# Patient Record
Sex: FEMALE | Race: WHITE | NOT HISPANIC OR LATINO | URBAN - METROPOLITAN AREA
[De-identification: names, ages, dates, MRNs, and addresses within clinical notes are randomized per-mention and may not be internally consistent; named-entity substitution may affect disease eponyms.]

---

## 2023-09-14 ENCOUNTER — HOSPITAL ENCOUNTER (OUTPATIENT)
Dept: DATA CONVERSION | Facility: HOSPITAL | Age: 78
Discharge: HOME | End: 2023-09-14

## 2023-09-14 DIAGNOSIS — M47.817 SPONDYLOSIS WITHOUT MYELOPATHY OR RADICULOPATHY, LUMBOSACRAL REGION: ICD-10-CM

## 2023-09-14 DIAGNOSIS — M96.1 POSTLAMINECTOMY SYNDROME, NOT ELSEWHERE CLASSIFIED: ICD-10-CM

## 2025-04-08 ENCOUNTER — APPOINTMENT (OUTPATIENT)
Dept: CARDIOLOGY | Facility: HOSPITAL | Age: 80
End: 2025-04-08
Payer: MEDICARE

## 2025-04-08 ENCOUNTER — APPOINTMENT (OUTPATIENT)
Dept: RADIOLOGY | Facility: HOSPITAL | Age: 80
End: 2025-04-08
Payer: MEDICARE

## 2025-04-08 ENCOUNTER — HOSPITAL ENCOUNTER (OUTPATIENT)
Facility: HOSPITAL | Age: 80
Setting detail: OBSERVATION
LOS: 1 days | Discharge: HOME | End: 2025-04-09
Attending: EMERGENCY MEDICINE | Admitting: INTERNAL MEDICINE
Payer: MEDICARE

## 2025-04-08 DIAGNOSIS — R55 SYNCOPE, UNSPECIFIED SYNCOPE TYPE: Primary | ICD-10-CM

## 2025-04-08 DIAGNOSIS — R53.83 LETHARGY: ICD-10-CM

## 2025-04-08 DIAGNOSIS — I10 HYPERTENSION, UNSPECIFIED TYPE: ICD-10-CM

## 2025-04-08 DIAGNOSIS — S09.90XA CLOSED HEAD INJURY, INITIAL ENCOUNTER: ICD-10-CM

## 2025-04-08 LAB
ALBUMIN SERPL BCP-MCNC: 4 G/DL (ref 3.4–5)
ALP SERPL-CCNC: 75 U/L (ref 33–136)
ALT SERPL W P-5'-P-CCNC: 13 U/L (ref 7–45)
ANION GAP BLDV CALCULATED.4IONS-SCNC: 9 MMOL/L (ref 10–25)
ANION GAP SERPL CALCULATED.3IONS-SCNC: 12 MMOL/L (ref 10–20)
APPEARANCE UR: CLEAR
AST SERPL W P-5'-P-CCNC: 16 U/L (ref 9–39)
BASE EXCESS BLDV CALC-SCNC: 1.8 MMOL/L (ref -2–3)
BASOPHILS # BLD AUTO: 0.05 X10*3/UL (ref 0–0.1)
BASOPHILS NFR BLD AUTO: 0.7 %
BILIRUB SERPL-MCNC: 0.4 MG/DL (ref 0–1.2)
BILIRUB UR STRIP.AUTO-MCNC: NEGATIVE MG/DL
BNP SERPL-MCNC: 39 PG/ML (ref 0–99)
BODY TEMPERATURE: 37 DEGREES CELSIUS
BUN SERPL-MCNC: 21 MG/DL (ref 6–23)
CA-I BLDV-SCNC: 1.2 MMOL/L (ref 1.1–1.33)
CALCIUM SERPL-MCNC: 9.3 MG/DL (ref 8.6–10.3)
CARDIAC TROPONIN I PNL SERPL HS: 5 NG/L (ref 0–13)
CARDIAC TROPONIN I PNL SERPL HS: 5 NG/L (ref 0–13)
CHLORIDE BLDV-SCNC: 106 MMOL/L (ref 98–107)
CHLORIDE SERPL-SCNC: 107 MMOL/L (ref 98–107)
CO2 SERPL-SCNC: 25 MMOL/L (ref 21–32)
COLOR UR: NORMAL
CREAT SERPL-MCNC: 0.8 MG/DL (ref 0.5–1.05)
EGFRCR SERPLBLD CKD-EPI 2021: 74 ML/MIN/1.73M*2
EOSINOPHIL # BLD AUTO: 0.22 X10*3/UL (ref 0–0.4)
EOSINOPHIL NFR BLD AUTO: 3 %
ERYTHROCYTE [DISTWIDTH] IN BLOOD BY AUTOMATED COUNT: 13.3 % (ref 11.5–14.5)
FLUAV RNA RESP QL NAA+PROBE: NOT DETECTED
FLUBV RNA RESP QL NAA+PROBE: NOT DETECTED
GLUCOSE BLDV-MCNC: 107 MG/DL (ref 74–99)
GLUCOSE SERPL-MCNC: 106 MG/DL (ref 74–99)
GLUCOSE UR STRIP.AUTO-MCNC: NORMAL MG/DL
HCO3 BLDV-SCNC: 27.2 MMOL/L (ref 22–26)
HCT VFR BLD AUTO: 38.7 % (ref 36–46)
HCT VFR BLD EST: 38 % (ref 36–46)
HGB BLD-MCNC: 12.1 G/DL (ref 12–16)
HGB BLDV-MCNC: 12.5 G/DL (ref 12–16)
IMM GRANULOCYTES # BLD AUTO: 0.02 X10*3/UL (ref 0–0.5)
IMM GRANULOCYTES NFR BLD AUTO: 0.3 % (ref 0–0.9)
INHALED O2 CONCENTRATION: 21 %
KETONES UR STRIP.AUTO-MCNC: NEGATIVE MG/DL
LACTATE BLDV-SCNC: 1.3 MMOL/L (ref 0.4–2)
LACTATE SERPL-SCNC: 1.2 MMOL/L (ref 0.4–2)
LEUKOCYTE ESTERASE UR QL STRIP.AUTO: NEGATIVE
LYMPHOCYTES # BLD AUTO: 1.95 X10*3/UL (ref 0.8–3)
LYMPHOCYTES NFR BLD AUTO: 26.4 %
MAGNESIUM SERPL-MCNC: 2.16 MG/DL (ref 1.6–2.4)
MCH RBC QN AUTO: 28.7 PG (ref 26–34)
MCHC RBC AUTO-ENTMCNC: 31.3 G/DL (ref 32–36)
MCV RBC AUTO: 92 FL (ref 80–100)
MONOCYTES # BLD AUTO: 0.6 X10*3/UL (ref 0.05–0.8)
MONOCYTES NFR BLD AUTO: 8.1 %
NEUTROPHILS # BLD AUTO: 4.56 X10*3/UL (ref 1.6–5.5)
NEUTROPHILS NFR BLD AUTO: 61.5 %
NITRITE UR QL STRIP.AUTO: NEGATIVE
NRBC BLD-RTO: 0 /100 WBCS (ref 0–0)
OXYHGB MFR BLDV: 55.2 % (ref 45–75)
PCO2 BLDV: 45 MM HG (ref 41–51)
PH BLDV: 7.39 PH (ref 7.33–7.43)
PH UR STRIP.AUTO: 7 [PH]
PLATELET # BLD AUTO: 224 X10*3/UL (ref 150–450)
PO2 BLDV: 32 MM HG (ref 35–45)
POTASSIUM BLDV-SCNC: 4.3 MMOL/L (ref 3.5–5.3)
POTASSIUM SERPL-SCNC: 4.2 MMOL/L (ref 3.5–5.3)
PROT SERPL-MCNC: 6.7 G/DL (ref 6.4–8.2)
PROT UR STRIP.AUTO-MCNC: NEGATIVE MG/DL
RBC # BLD AUTO: 4.22 X10*6/UL (ref 4–5.2)
RBC # UR STRIP.AUTO: NEGATIVE MG/DL
SAO2 % BLDV: 56 % (ref 45–75)
SARS-COV-2 RNA RESP QL NAA+PROBE: NOT DETECTED
SODIUM BLDV-SCNC: 138 MMOL/L (ref 136–145)
SODIUM SERPL-SCNC: 140 MMOL/L (ref 136–145)
SP GR UR STRIP.AUTO: 1.02
UROBILINOGEN UR STRIP.AUTO-MCNC: NORMAL MG/DL
WBC # BLD AUTO: 7.4 X10*3/UL (ref 4.4–11.3)

## 2025-04-08 PROCEDURE — 70450 CT HEAD/BRAIN W/O DYE: CPT

## 2025-04-08 PROCEDURE — 99285 EMERGENCY DEPT VISIT HI MDM: CPT | Mod: 25 | Performed by: EMERGENCY MEDICINE

## 2025-04-08 PROCEDURE — 85025 COMPLETE CBC W/AUTO DIFF WBC: CPT

## 2025-04-08 PROCEDURE — 83880 ASSAY OF NATRIURETIC PEPTIDE: CPT

## 2025-04-08 PROCEDURE — 87636 SARSCOV2 & INF A&B AMP PRB: CPT

## 2025-04-08 PROCEDURE — 84484 ASSAY OF TROPONIN QUANT: CPT

## 2025-04-08 PROCEDURE — 2550000001 HC RX 255 CONTRASTS: Performed by: EMERGENCY MEDICINE

## 2025-04-08 PROCEDURE — 83605 ASSAY OF LACTIC ACID: CPT

## 2025-04-08 PROCEDURE — 1200000002 HC GENERAL ROOM WITH TELEMETRY DAILY

## 2025-04-08 PROCEDURE — 2500000004 HC RX 250 GENERAL PHARMACY W/ HCPCS (ALT 636 FOR OP/ED): Performed by: INTERNAL MEDICINE

## 2025-04-08 PROCEDURE — 83735 ASSAY OF MAGNESIUM: CPT

## 2025-04-08 PROCEDURE — 36415 COLL VENOUS BLD VENIPUNCTURE: CPT

## 2025-04-08 PROCEDURE — 2500000004 HC RX 250 GENERAL PHARMACY W/ HCPCS (ALT 636 FOR OP/ED): Performed by: EMERGENCY MEDICINE

## 2025-04-08 PROCEDURE — 2500000001 HC RX 250 WO HCPCS SELF ADMINISTERED DRUGS (ALT 637 FOR MEDICARE OP): Performed by: INTERNAL MEDICINE

## 2025-04-08 PROCEDURE — 72125 CT NECK SPINE W/O DYE: CPT

## 2025-04-08 PROCEDURE — 72125 CT NECK SPINE W/O DYE: CPT | Performed by: RADIOLOGY

## 2025-04-08 PROCEDURE — 93010 ELECTROCARDIOGRAM REPORT: CPT | Performed by: INTERNAL MEDICINE

## 2025-04-08 PROCEDURE — 96360 HYDRATION IV INFUSION INIT: CPT | Mod: 59

## 2025-04-08 PROCEDURE — 2500000004 HC RX 250 GENERAL PHARMACY W/ HCPCS (ALT 636 FOR OP/ED)

## 2025-04-08 PROCEDURE — 84132 ASSAY OF SERUM POTASSIUM: CPT

## 2025-04-08 PROCEDURE — 84132 ASSAY OF SERUM POTASSIUM: CPT | Mod: 59

## 2025-04-08 PROCEDURE — 71275 CT ANGIOGRAPHY CHEST: CPT | Performed by: RADIOLOGY

## 2025-04-08 PROCEDURE — 71275 CT ANGIOGRAPHY CHEST: CPT

## 2025-04-08 PROCEDURE — 2500000001 HC RX 250 WO HCPCS SELF ADMINISTERED DRUGS (ALT 637 FOR MEDICARE OP): Performed by: EMERGENCY MEDICINE

## 2025-04-08 PROCEDURE — 70450 CT HEAD/BRAIN W/O DYE: CPT | Performed by: RADIOLOGY

## 2025-04-08 PROCEDURE — 99222 1ST HOSP IP/OBS MODERATE 55: CPT | Performed by: INTERNAL MEDICINE

## 2025-04-08 PROCEDURE — 96361 HYDRATE IV INFUSION ADD-ON: CPT

## 2025-04-08 PROCEDURE — 81003 URINALYSIS AUTO W/O SCOPE: CPT | Performed by: EMERGENCY MEDICINE

## 2025-04-08 PROCEDURE — 93005 ELECTROCARDIOGRAM TRACING: CPT

## 2025-04-08 RX ORDER — ACETAMINOPHEN 325 MG/1
975 TABLET ORAL ONCE
Status: COMPLETED | OUTPATIENT
Start: 2025-04-08 | End: 2025-04-08

## 2025-04-08 RX ORDER — TRAZODONE HYDROCHLORIDE 50 MG/1
50 TABLET ORAL NIGHTLY
Status: DISCONTINUED | OUTPATIENT
Start: 2025-04-08 | End: 2025-04-09 | Stop reason: HOSPADM

## 2025-04-08 RX ORDER — CALCIUM CARBONATE 200(500)MG
500 TABLET,CHEWABLE ORAL ONCE
Status: COMPLETED | OUTPATIENT
Start: 2025-04-08 | End: 2025-04-08

## 2025-04-08 RX ORDER — GABAPENTIN 300 MG/1
300 CAPSULE ORAL ONCE
Status: COMPLETED | OUTPATIENT
Start: 2025-04-08 | End: 2025-04-08

## 2025-04-08 RX ORDER — ENOXAPARIN SODIUM 100 MG/ML
40 INJECTION SUBCUTANEOUS DAILY
Status: DISCONTINUED | OUTPATIENT
Start: 2025-04-08 | End: 2025-04-09 | Stop reason: HOSPADM

## 2025-04-08 RX ORDER — LANOLIN ALCOHOL/MO/W.PET/CERES
400 CREAM (GRAM) TOPICAL ONCE
Status: COMPLETED | OUTPATIENT
Start: 2025-04-08 | End: 2025-04-08

## 2025-04-08 RX ADMIN — Medication 400 MG: at 17:34

## 2025-04-08 RX ADMIN — SODIUM CHLORIDE 1000 ML: 900 INJECTION, SOLUTION INTRAVENOUS at 13:46

## 2025-04-08 RX ADMIN — TRAZODONE HYDROCHLORIDE 50 MG: 50 TABLET ORAL at 20:33

## 2025-04-08 RX ADMIN — GABAPENTIN 300 MG: 300 CAPSULE ORAL at 17:34

## 2025-04-08 RX ADMIN — IOHEXOL 75 ML: 350 INJECTION, SOLUTION INTRAVENOUS at 11:28

## 2025-04-08 RX ADMIN — CALCIUM CARBONATE (ANTACID) CHEW TAB 500 MG 500 MG: 500 CHEW TAB at 17:34

## 2025-04-08 RX ADMIN — SODIUM CHLORIDE 1000 ML: 900 INJECTION, SOLUTION INTRAVENOUS at 13:48

## 2025-04-08 RX ADMIN — SODIUM CHLORIDE 500 ML: 900 INJECTION, SOLUTION INTRAVENOUS at 11:43

## 2025-04-08 RX ADMIN — ACETAMINOPHEN 975 MG: 325 TABLET, FILM COATED ORAL at 11:42

## 2025-04-08 SDOH — SOCIAL STABILITY: SOCIAL INSECURITY: DO YOU FEEL ANYONE HAS EXPLOITED OR TAKEN ADVANTAGE OF YOU FINANCIALLY OR OF YOUR PERSONAL PROPERTY?: NO

## 2025-04-08 SDOH — SOCIAL STABILITY: SOCIAL INSECURITY: ARE YOU OR HAVE YOU BEEN THREATENED OR ABUSED PHYSICALLY, EMOTIONALLY, OR SEXUALLY BY ANYONE?: NO

## 2025-04-08 SDOH — SOCIAL STABILITY: SOCIAL INSECURITY: WERE YOU ABLE TO COMPLETE ALL THE BEHAVIORAL HEALTH SCREENINGS?: YES

## 2025-04-08 SDOH — SOCIAL STABILITY: SOCIAL INSECURITY: ABUSE: ADULT

## 2025-04-08 SDOH — SOCIAL STABILITY: SOCIAL INSECURITY: HAVE YOU HAD THOUGHTS OF HARMING ANYONE ELSE?: NO

## 2025-04-08 SDOH — SOCIAL STABILITY: SOCIAL INSECURITY: HAVE YOU HAD ANY THOUGHTS OF HARMING ANYONE ELSE?: NO

## 2025-04-08 SDOH — SOCIAL STABILITY: SOCIAL INSECURITY: DOES ANYONE TRY TO KEEP YOU FROM HAVING/CONTACTING OTHER FRIENDS OR DOING THINGS OUTSIDE YOUR HOME?: NO

## 2025-04-08 SDOH — SOCIAL STABILITY: SOCIAL INSECURITY: ARE THERE ANY APPARENT SIGNS OF INJURIES/BEHAVIORS THAT COULD BE RELATED TO ABUSE/NEGLECT?: NO

## 2025-04-08 SDOH — SOCIAL STABILITY: SOCIAL INSECURITY: DO YOU FEEL UNSAFE GOING BACK TO THE PLACE WHERE YOU ARE LIVING?: NO

## 2025-04-08 SDOH — SOCIAL STABILITY: SOCIAL INSECURITY: HAS ANYONE EVER THREATENED TO HURT YOUR FAMILY OR YOUR PETS?: NO

## 2025-04-08 ASSESSMENT — COGNITIVE AND FUNCTIONAL STATUS - GENERAL
MOBILITY SCORE: 24
DAILY ACTIVITIY SCORE: 24
MOBILITY SCORE: 24
DAILY ACTIVITIY SCORE: 24
PATIENT BASELINE BEDBOUND: NO

## 2025-04-08 ASSESSMENT — ACTIVITIES OF DAILY LIVING (ADL)
HEARING - LEFT EAR: FUNCTIONAL
PATIENT'S MEMORY ADEQUATE TO SAFELY COMPLETE DAILY ACTIVITIES?: YES
ADEQUATE_TO_COMPLETE_ADL: YES
BATHING: INDEPENDENT
HEARING - RIGHT EAR: FUNCTIONAL
GROOMING: INDEPENDENT
DRESSING YOURSELF: INDEPENDENT
WALKS IN HOME: INDEPENDENT
JUDGMENT_ADEQUATE_SAFELY_COMPLETE_DAILY_ACTIVITIES: YES
TOILETING: INDEPENDENT
LACK_OF_TRANSPORTATION: NO
FEEDING YOURSELF: INDEPENDENT

## 2025-04-08 ASSESSMENT — LIFESTYLE VARIABLES
HOW OFTEN DO YOU HAVE A DRINK CONTAINING ALCOHOL: MONTHLY OR LESS
SKIP TO QUESTIONS 9-10: 0
AUDIT-C TOTAL SCORE: 2
HOW OFTEN DO YOU HAVE 6 OR MORE DRINKS ON ONE OCCASION: LESS THAN MONTHLY
PRESCIPTION_ABUSE_PAST_12_MONTHS: NO
AUDIT-C TOTAL SCORE: 2
HOW MANY STANDARD DRINKS CONTAINING ALCOHOL DO YOU HAVE ON A TYPICAL DAY: 1 OR 2
SUBSTANCE_ABUSE_PAST_12_MONTHS: NO

## 2025-04-08 ASSESSMENT — ENCOUNTER SYMPTOMS
WEAKNESS: 1
DIZZINESS: 1
LIGHT-HEADEDNESS: 1
ABDOMINAL PAIN: 0
NUMBNESS: 0
FREQUENCY: 0
HEADACHES: 0
AGITATION: 0
FATIGUE: 1
BACK PAIN: 0
FEVER: 0
DIARRHEA: 0
SHORTNESS OF BREATH: 0
NECK PAIN: 0
CHILLS: 0
FLANK PAIN: 0
NAUSEA: 0
CONFUSION: 0
DYSURIA: 0
CHEST TIGHTNESS: 0
VOMITING: 0

## 2025-04-08 ASSESSMENT — PAIN SCALES - GENERAL
PAINLEVEL_OUTOF10: 0 - NO PAIN

## 2025-04-08 ASSESSMENT — PATIENT HEALTH QUESTIONNAIRE - PHQ9
2. FEELING DOWN, DEPRESSED OR HOPELESS: NOT AT ALL
1. LITTLE INTEREST OR PLEASURE IN DOING THINGS: NOT AT ALL
SUM OF ALL RESPONSES TO PHQ9 QUESTIONS 1 & 2: 0

## 2025-04-08 ASSESSMENT — PAIN - FUNCTIONAL ASSESSMENT
PAIN_FUNCTIONAL_ASSESSMENT: 0-10
PAIN_FUNCTIONAL_ASSESSMENT: 0-10

## 2025-04-08 NOTE — ASSESSMENT & PLAN NOTE
- most likely from dehydration from recent increased activity post-back surgery  - ECG, UA, BNP, lactate, troponins, CTA, CT- C-spine, and CT- head all unremarkable  - she has received 2500 mL of isotonic saline in the ED  - plan to order ECHO, carotid dopplers, and monitor with telemetry  - she will receive her afternoon doses of gabapentin 300 mg, magnesium 400 mg, calcium 600 mg, and her nighttime dose of trazadone 50 mg    JW-patient independently examined, history was independently confirmed.  Unclear as to what was the precedence event that caused the patient to have a syncopal event but she does state that this happened to her last year in August had a full workup for this including echocardiogram, Holter monitor, head CT, and carotid Dopplers all stating that this was normal.  This was done at [facility] mentor.  I explained to the patient and family at bedside that we will need to repeat some of these it has been about 8 months and maybe something is changed.  Patient also stated that she is been feeling very lethargic lately.  Patient's blood pressure was noted to be elevated so far during hospital stay.  Daughter stated that blood pressure medication was stopped last year after this was noticed to be low frequently.  There was concern that maybe her symptoms/were caused by hypotension.  However patient symptoms currently especially fatigue and not feeling herself could be related to hypertension.  Will continue to monitor.  I discussed with patient and daughters that may be adding 2.5 mg to 5 mg of amlodipine if blood pressure sustains.  In any case we will order as noted above evaluation for any cardio neuro causes.  Checking orthostatics.

## 2025-04-08 NOTE — H&P
History Of Present Illness  Carla Spain is a 79 y.o. female presenting with generalized malaise, fatigue and a syncopal event. The patient reportedly did not feel well at home and became lightheaded and then passed out and fell. She did hit her head. Unknown exactly how long she had a loss of consciousness for. Her children report that she had back surgery on 11 March 2025. Patient reports she has been much more active since her surgery, as her pain has improved greatly. She denies any headache or visual changes, neck or back pain, chest pain or SOB, abdominal pain, N/V, or diarrhea. Denies urinary complaints, fever, chills or recent illness. Denies focal weakness or numbness.      Past Medical History  History reviewed. No pertinent past medical history.    Surgical History  Past Surgical History:   Procedure Laterality Date    MR HEAD ANGIO WO IV CONTRAST  2/1/2016    MR HEAD ANGIO WO IV CONTRAST [facility] INPATIENT LEGACY        Social History  She has no history on file for tobacco use, alcohol use, and drug use.    Family History  No family history on file.     Allergies  Sulfa (sulfonamide antibiotics), Oxycodone-acetaminophen, and Tramadol    Review of Systems   Constitutional:  Positive for fatigue. Negative for chills and fever.   Respiratory:  Negative for chest tightness and shortness of breath.    Cardiovascular:  Negative for chest pain.   Gastrointestinal:  Negative for abdominal pain, diarrhea, nausea and vomiting.   Genitourinary:  Negative for dysuria, flank pain and frequency.   Musculoskeletal:  Negative for back pain and neck pain.   Neurological:  Positive for dizziness, syncope, weakness and light-headedness. Negative for numbness and headaches.   Psychiatric/Behavioral:  Negative for agitation and confusion.         Physical Exam  Constitutional:       General: She is not in acute distress.  HENT:      Head: Normocephalic and atraumatic.      Nose: No congestion or rhinorrhea.  "  Cardiovascular:      Rate and Rhythm: Normal rate and regular rhythm.   Pulmonary:      Effort: Pulmonary effort is normal. No respiratory distress.      Breath sounds: Normal breath sounds. No wheezing, rhonchi or rales.   Abdominal:      General: Abdomen is flat. There is no distension.      Palpations: Abdomen is soft.      Tenderness: There is no abdominal tenderness. There is no guarding or rebound.   Musculoskeletal:      Right lower leg: No edema.      Left lower leg: No edema.   Skin:     General: Skin is warm and dry.   Neurological:      Mental Status: She is alert.   Psychiatric:         Mood and Affect: Mood normal.         Behavior: Behavior normal.          Last Recorded Vitals  Blood pressure (!) 175/102, pulse 75, temperature 36.4 °C (97.6 °F), temperature source Oral, resp. rate 20, height 1.626 m (5' 4\"), weight 95.3 kg (210 lb), SpO2 95%.    Current Outpatient Medications  - Gabapentin 300 mg BID  - Magnesium 400 mg BID  - Calcium 600 mg BID  - Pravastatin 20 mg q.d  - Asprin 81 mg q.d  - Oxybutynin 5 mg q.d  - Trazadone 50 mg QHS      Relevant Results    Results for orders placed or performed during the hospital encounter of 04/08/25 (from the past 24 hours)   CBC and Auto Differential   Result Value Ref Range    WBC 7.4 4.4 - 11.3 x10*3/uL    nRBC 0.0 0.0 - 0.0 /100 WBCs    RBC 4.22 4.00 - 5.20 x10*6/uL    Hemoglobin 12.1 12.0 - 16.0 g/dL    Hematocrit 38.7 36.0 - 46.0 %    MCV 92 80 - 100 fL    MCH 28.7 26.0 - 34.0 pg    MCHC 31.3 (L) 32.0 - 36.0 g/dL    RDW 13.3 11.5 - 14.5 %    Platelets 224 150 - 450 x10*3/uL    Neutrophils % 61.5 40.0 - 80.0 %    Immature Granulocytes %, Automated 0.3 0.0 - 0.9 %    Lymphocytes % 26.4 13.0 - 44.0 %    Monocytes % 8.1 2.0 - 10.0 %    Eosinophils % 3.0 0.0 - 6.0 %    Basophils % 0.7 0.0 - 2.0 %    Neutrophils Absolute 4.56 1.60 - 5.50 x10*3/uL    Immature Granulocytes Absolute, Automated 0.02 0.00 - 0.50 x10*3/uL    Lymphocytes Absolute 1.95 0.80 - 3.00 " x10*3/uL    Monocytes Absolute 0.60 0.05 - 0.80 x10*3/uL    Eosinophils Absolute 0.22 0.00 - 0.40 x10*3/uL    Basophils Absolute 0.05 0.00 - 0.10 x10*3/uL   Comprehensive metabolic panel   Result Value Ref Range    Glucose 106 (H) 74 - 99 mg/dL    Sodium 140 136 - 145 mmol/L    Potassium 4.2 3.5 - 5.3 mmol/L    Chloride 107 98 - 107 mmol/L    Bicarbonate 25 21 - 32 mmol/L    Anion Gap 12 10 - 20 mmol/L    Urea Nitrogen 21 6 - 23 mg/dL    Creatinine 0.80 0.50 - 1.05 mg/dL    eGFR 74 >60 mL/min/1.73m*2    Calcium 9.3 8.6 - 10.3 mg/dL    Albumin 4.0 3.4 - 5.0 g/dL    Alkaline Phosphatase 75 33 - 136 U/L    Total Protein 6.7 6.4 - 8.2 g/dL    AST 16 9 - 39 U/L    Bilirubin, Total 0.4 0.0 - 1.2 mg/dL    ALT 13 7 - 45 U/L   Magnesium   Result Value Ref Range    Magnesium 2.16 1.60 - 2.40 mg/dL   Lactate   Result Value Ref Range    Lactate 1.2 0.4 - 2.0 mmol/L   B-Type Natriuretic Peptide   Result Value Ref Range    BNP 39 0 - 99 pg/mL   Blood Gas Venous Full Panel   Result Value Ref Range    POCT pH, Venous 7.39 7.33 - 7.43 pH    POCT pCO2, Venous 45 41 - 51 mm Hg    POCT pO2, Venous 32 (L) 35 - 45 mm Hg    POCT SO2, Venous 56 45 - 75 %    POCT Oxy Hemoglobin, Venous 55.2 45.0 - 75.0 %    POCT Hematocrit Calculated, Venous 38.0 36.0 - 46.0 %    POCT Sodium, Venous 138 136 - 145 mmol/L    POCT Potassium, Venous 4.3 3.5 - 5.3 mmol/L    POCT Chloride, Venous 106 98 - 107 mmol/L    POCT Ionized Calicum, Venous 1.20 1.10 - 1.33 mmol/L    POCT Glucose, Venous 107 (H) 74 - 99 mg/dL    POCT Lactate, Venous 1.3 0.4 - 2.0 mmol/L    POCT Base Excess, Venous 1.8 -2.0 - 3.0 mmol/L    POCT HCO3 Calculated, Venous 27.2 (H) 22.0 - 26.0 mmol/L    POCT Hemoglobin, Venous 12.5 12.0 - 16.0 g/dL    POCT Anion Gap, Venous 9.0 (L) 10.0 - 25.0 mmol/L    Patient Temperature 37.0 degrees Celsius    FiO2 21 %   Sars-CoV-2 and Influenza A/B PCR   Result Value Ref Range    Flu A Result Not Detected Not Detected    Flu B Result Not Detected Not  Detected    Coronavirus 2019, PCR Not Detected Not Detected   Troponin I, High Sensitivity, Initial   Result Value Ref Range    Troponin I, High Sensitivity 5 0 - 13 ng/L   Troponin, High Sensitivity, 1 Hour   Result Value Ref Range    Troponin I, High Sensitivity 5 0 - 13 ng/L   Urinalysis with Reflex Culture and Microscopic   Result Value Ref Range    Color, Urine Light-Yellow Light-Yellow, Yellow, Dark-Yellow    Appearance, Urine Clear Clear    Specific Gravity, Urine 1.016 1.005 - 1.035    pH, Urine 7.0 5.0, 5.5, 6.0, 6.5, 7.0, 7.5, 8.0    Protein, Urine NEGATIVE NEGATIVE, 10 (TRACE), 20 (TRACE) mg/dL    Glucose, Urine Normal Normal mg/dL    Blood, Urine NEGATIVE NEGATIVE mg/dL    Ketones, Urine NEGATIVE NEGATIVE mg/dL    Bilirubin, Urine NEGATIVE NEGATIVE mg/dL    Urobilinogen, Urine Normal Normal mg/dL    Nitrite, Urine NEGATIVE NEGATIVE    Leukocyte Esterase, Urine NEGATIVE NEGATIVE      CT angio chest for pulmonary embolism    Result Date: 4/8/2025  Interpreted By:  Mauricio Lincoln, STUDY: CT ANGIO CHEST FOR PULMONARY EMBOLISM;  4/8/2025 11:44 am   INDICATION: Signs/Symptoms:Syncopal episode, hypoxia.   COMPARISON: Chest radiograph 10/24/2022   ACCESSION NUMBER(S): [id number]   ORDERING CLINICIAN: CHARISSE PRATHER   TECHNIQUE: Helical data acquisition of the chest was obtained after the intravenous administration of  of contrast. Images were reformatted in coronal and sagittal planes. Axial and coronal MIP images were created and reviewed.   FINDINGS: POTENTIAL LIMITATIONS OF THE STUDY: None   HEART AND VESSELS: Ectatic and tortuous course of the descending thoracic aorta without evidence of aneurysm.   Main pulmonary artery measures up to 3 cm in anterior-posterior dimension which may indicate sequela of pulmonary hypertension.   The thoracic aorta is unremarkable with respect to course, caliber, and contour.   Mild coronary atherosclerotic calcifications.The study is not optimized for evaluation of coronary  arteries.   Cardiomegaly.   No evidence of pericardial effusion.   MEDIASTINUM AND RICH, LOWER NECK AND AXILLA: The visualized thyroid gland is within normal limits.   No evidence of thoracic lymphadenopathy by CT criteria.Subcentimeter mediastinal lymph nodes are present, nonspecific.   Small sliding-type hiatal hernia.   LUNGS AND AIRWAYS: The trachea and central airways are patent. No endobronchial lesion.   No consolidation, pneumothorax, or effusion.   Chronic appearing ankle changes with somewhat bibasilar predominance as well as chronic appearing parenchymal changes of the right middle lobe and lingula.   UPPER ABDOMEN: The visualized subdiaphragmatic structures demonstrate no remarkable findings.   CHEST WALL AND OSSEOUS STRUCTURES: No acute osseous abnormality.Multilevel degenerative changes are noted throughout the imaged spine.       1.  No evidence of pulmonary embolus. 2. Cardiomegaly with coronary atherosclerotic calcifications. 3. 3 cm main pulmonary artery diameter which may indicate pulmonary hypertension.     Signed by: Mauricio Lincoln 4/8/2025 12:27 PM Dictation workstation:   [id number]    CT cervical spine wo IV contrast    Result Date: 4/8/2025  Interpreted By:  Mauricio Lincoln, STUDY: CT CERVICAL SPINE WO IV CONTRAST;  4/8/2025 11:43 am   INDICATION: Signs/Symptoms:Fall.     COMPARISON: None.   ACCESSION NUMBER(S): [id number]   ORDERING CLINICIAN: CHARISSE PRATHER   TECHNIQUE: Axial CT images of the cervical spine are obtained. Axial, coronal and sagittal reconstructions are provided for review.   FINDINGS:     Fractures: There is no evidence for an acute fracture of the cervical spine.   Vertebral Alignment: No posttraumatic malalignment. Grade 1 anterolisthesis of C4 on C5, likely on a degenerative nature. There is overall straightening of the normal cervical lordosis, presumed secondary to patient positioning or spasm.   Craniocervical Junction: The odontoid process and craniocervical  junction are intact.   Vertebrae/Disc Spaces:  Multilevel disc space narrowing. Multilevel facet arthropathy Multilevel uncovertebral hypertrophy.Multilevel osteophyte formation.Multilevel presumed chronic or degenerative changes.   Prevertebral/Paraspinal Soft Tissues: The prevertebral and paraspinal soft tissues are unremarkable.         Multilevel spondylosis without acute osseous abnormality detected of the cervical spine.   MACRO: None   Signed by: Mauricio Lincoln 4/8/2025 12:04 PM Dictation workstation:   [id number]    CT head wo IV contrast    Result Date: 4/8/2025  Interpreted By:  Mauricio Lincoln, STUDY: CT HEAD WO IV CONTRAST;  4/8/2025 11:43 am   INDICATION: Signs/Symptoms:Fall.   COMPARISON: None.   ACCESSION NUMBER(S): [id number]   ORDERING CLINICIAN: CHARISSE PRATHER   TECHNIQUE: Noncontrast axial CT scan of head was performed. Angled reformats in brain and bone windows were generated. The images were reviewed in bone, brain, blood and soft tissue windows.   FINDINGS: CSF Spaces: The ventricles, sulci and basal cisterns are within normal limits. There is no extraaxial fluid collection.   Parenchyma: Mild parenchymal atrophy. Periventricular and subcortical white matter hypoattenuation is nonspecific, however likely reflects chronic microvascular ischemic versus chronic hypertensive changes. The grey-white differentiation is intact. There is no mass effect or midline shift.  There is no intracranial hemorrhage.   Calvarium: No acute displaced calvarial fracture.   Paranasal sinuses and mastoids: Mild bilateral ethmoid sinus mucosal thickening. Mastoid air cells appear clear.       No CT evidence of acute intracranial injury.       MACRO: None     Signed by: Mauricio Lincoln 4/8/2025 12:02 PM Dictation workstation:   [id number]         Assessment/Plan   Assessment & Plan  Syncope, unspecified syncope type  - most likely from dehydration from recent increased activity post-back surgery  - ECG, UA, BNP,  lactate, troponins, CTA, CT- C-spine, and CT- head all unremarkable  - she has received 2500 mL of isotonic saline in the ED  - plan to order ECHO, carotid dopplers, and monitor with telemetry  - she will receive her afternoon doses of gabapentin 300 mg, magnesium 400 mg, calcium 600 mg, and her nighttime dose of trazadone 50 mg    JW-patient independently examined, history was independently confirmed.  Unclear as to what was the precedence event that caused the patient to have a syncopal event but she does state that this happened to her last year in August had a full workup for this including echocardiogram, Holter monitor, head CT, and carotid Dopplers all stating that this was normal.  This was done at [facility] mentor.  I explained to the patient and family at bedside that we will need to repeat some of these it has been about 8 months and maybe something is changed.  Patient also stated that she is been feeling very lethargic lately.  Patient's blood pressure was noted to be elevated so far during hospital stay.  Daughter stated that blood pressure medication was stopped last year after this was noticed to be low frequently.  There was concern that maybe her symptoms/were caused by hypotension.  However patient symptoms currently especially fatigue and not feeling herself could be related to hypertension.  Will continue to monitor.  I discussed with patient and daughters that may be adding 2.5 mg to 5 mg of amlodipine if blood pressure sustains.  In any case we will order as noted above evaluation for any cardio neuro causes.  Checking orthostatics.       JOCELYN LOPEZ, OMS-III  4/8/2025  3:58PM

## 2025-04-08 NOTE — PROGRESS NOTES
Attestation/Supervisory note for JUAN F Baum      The patient is an 79-year-old female presenting to the emergency department for evaluation of generalized malaise, fatigue and a syncopal event.  The patient reportedly did not feel well at home and became lightheaded and then passed out and fell.  She did hit her head.  Unknown exactly how long she had a loss of consciousness for.  She states that she does not really have any symptoms right now other than generalized malaise and fatigue.  Her children report that she had back surgery on 11 March 2025.  They report that she did have a urinary tract in the past and had similar symptoms as she did today.  She denies any headache or visual changes.  No neck or back pain at this time.  No chest pain or shortness of breath.  No abdominal pain.  No nausea, vomiting or diarrhea.  No urinary complaints.  No fever or chills.  No focal weakness or numbness.  All pertinent positives and negatives are recorded above.  All other systems reviewed and otherwise negative.  Vital signs with hypertension but otherwise within normal limits.  Physical exam with a well-nourished well-developed female in no acute distress.  HEENT exam within normal limits.  She has no evidence of airway compromise or respiratory distress.  Abdominal exam is benign.  She does not have any gross motor, neurologic or vascular deficits on exam.  Pulses are equal bilaterally.  NIH stroke scale score of 0 at this time.      EKG with normal sinus rhythm at 79 bpm, low voltage, normal axis, normal ST segment, and a slight diffuse flattening of the T waves      Oral acetaminophen and IV fluids ordered.      Diagnostic labs without significant abnormality      Initial troponin of 5.  Repeat troponin 5      CT angio chest for pulmonary embolism   Final Result   1.  No evidence of pulmonary embolus.   2. Cardiomegaly with coronary atherosclerotic calcifications.   3. 3 cm main pulmonary artery diameter which may  indicate pulmonary   hypertension.             Signed by: Mauricio Stormkarel 4/8/2025 12:27 PM   Dictation workstation:   [id number]      CT head wo IV contrast   Final Result   No CT evidence of acute intracranial injury.                  MACRO:   None             Signed by: Mauricio Stormkarel 4/8/2025 12:02 PM   Dictation workstation:   [id number]      CT cervical spine wo IV contrast   Final Result   Multilevel spondylosis without acute osseous abnormality detected of   the cervical spine.        MACRO:   None        Signed by: Mauricio Stormkarel 4/8/2025 12:04 PM   Dictation workstation:   [id number]             The patient does not have any gross motor, neurologic or vascular episodes on exam.  No visible or palpable bony deformity.  NIH stroke scale score of 0 at this time.  No indication for tPA/TNK given that she does not have any neurologic deficits on exam.  CT head without evidence of intracranial hemorrhage or mass effect.  No evidence of skull fracture.  No evidence of CVA.  CT C-spine without evidence of fracture or dislocation.  CT chest abdomen pelvis shows no evidence of dissection or PE.  No evidence of pneumonia or pneumothorax.  No evidence of CHF.  No widening of the mediastinum.  No acute process within the abdomen and pelvis.  The patient was having difficulty with trial of ambulation due to her symptoms.  She states that she just feels very fatigued.  Her children are requesting that she be admitted for further management given her syncopal event.  The hospitalist was consulted and admitted the patient for further management.        Impression/diagnosis:  Syncope and collapse  Closed head injury  Hypertension, unspecified  Malaise and fatigue      I personally saw the patient and made/approve the management plan and take responsibility for the patient management.      I independently interpreted the following study (S) EKG and diagnostic labs      I personally discussed the patient's management with  the patient      I reviewed the results of the diagnostic labs and diagnostic imaging.  Formal radiology read was completed by the radiologist.      Carol Ann Gonzáles MD

## 2025-04-09 ENCOUNTER — APPOINTMENT (OUTPATIENT)
Dept: CARDIOLOGY | Facility: HOSPITAL | Age: 80
End: 2025-04-09
Payer: MEDICARE

## 2025-04-09 ENCOUNTER — PHARMACY VISIT (OUTPATIENT)
Dept: PHARMACY | Facility: CLINIC | Age: 80
End: 2025-04-09
Payer: MEDICARE

## 2025-04-09 ENCOUNTER — APPOINTMENT (OUTPATIENT)
Dept: RADIOLOGY | Facility: HOSPITAL | Age: 80
End: 2025-04-09
Payer: MEDICARE

## 2025-04-09 VITALS
HEART RATE: 85 BPM | WEIGHT: 210 LBS | RESPIRATION RATE: 20 BRPM | TEMPERATURE: 99 F | HEIGHT: 64 IN | DIASTOLIC BLOOD PRESSURE: 90 MMHG | SYSTOLIC BLOOD PRESSURE: 140 MMHG | OXYGEN SATURATION: 94 % | BODY MASS INDEX: 35.85 KG/M2

## 2025-04-09 LAB
ANION GAP SERPL CALCULATED.3IONS-SCNC: 9 MMOL/L (ref 10–20)
AORTIC VALVE MEAN GRADIENT: 4 MMHG
AORTIC VALVE PEAK VELOCITY: 1.28 M/S
ATRIAL RATE: 79 BPM
AV PEAK GRADIENT: 7 MMHG
AVA (PEAK VEL): 2.53 CM2
AVA (VTI): 2.42 CM2
BODY SURFACE AREA: 2.07 M2
BODY SURFACE AREA: 2.07 M2
BUN SERPL-MCNC: 14 MG/DL (ref 6–23)
CALCIUM SERPL-MCNC: 8.6 MG/DL (ref 8.6–10.3)
CHLORIDE SERPL-SCNC: 112 MMOL/L (ref 98–107)
CO2 SERPL-SCNC: 23 MMOL/L (ref 21–32)
CREAT SERPL-MCNC: 0.66 MG/DL (ref 0.5–1.05)
EGFRCR SERPLBLD CKD-EPI 2021: 88 ML/MIN/1.73M*2
EJECTION FRACTION APICAL 4 CHAMBER: 63.5
EJECTION FRACTION: 63 %
GLUCOSE SERPL-MCNC: 95 MG/DL (ref 74–99)
LEFT VENTRICLE INTERNAL DIMENSION DIASTOLE: 4.17 CM (ref 3.5–6)
LEFT VENTRICULAR OUTFLOW TRACT DIAMETER: 2 CM
MITRAL VALVE E/A RATIO: 0.63
P AXIS: 53 DEGREES
P OFFSET: 189 MS
P ONSET: 130 MS
POTASSIUM SERPL-SCNC: 4 MMOL/L (ref 3.5–5.3)
PR INTERVAL: 188 MS
Q ONSET: 224 MS
QRS COUNT: 13 BEATS
QRS DURATION: 94 MS
QT INTERVAL: 412 MS
QTC CALCULATION(BAZETT): 472 MS
QTC FREDERICIA: 451 MS
R AXIS: 21 DEGREES
RIGHT VENTRICLE FREE WALL PEAK S': 15.1 CM/S
SODIUM SERPL-SCNC: 140 MMOL/L (ref 136–145)
T AXIS: 3 DEGREES
T OFFSET: 430 MS
TRICUSPID ANNULAR PLANE SYSTOLIC EXCURSION: 2.4 CM
VENTRICULAR RATE: 79 BPM

## 2025-04-09 PROCEDURE — RXMED WILLOW AMBULATORY MEDICATION CHARGE

## 2025-04-09 PROCEDURE — G0378 HOSPITAL OBSERVATION PER HR: HCPCS

## 2025-04-09 PROCEDURE — 93246 EXT ECG>7D<15D RECORDING: CPT

## 2025-04-09 PROCEDURE — 2500000001 HC RX 250 WO HCPCS SELF ADMINISTERED DRUGS (ALT 637 FOR MEDICARE OP): Performed by: INTERNAL MEDICINE

## 2025-04-09 PROCEDURE — 36415 COLL VENOUS BLD VENIPUNCTURE: CPT | Performed by: INTERNAL MEDICINE

## 2025-04-09 PROCEDURE — 99239 HOSP IP/OBS DSCHRG MGMT >30: CPT | Performed by: INTERNAL MEDICINE

## 2025-04-09 PROCEDURE — 93306 TTE W/DOPPLER COMPLETE: CPT

## 2025-04-09 PROCEDURE — 93880 EXTRACRANIAL BILAT STUDY: CPT

## 2025-04-09 PROCEDURE — 80048 BASIC METABOLIC PNL TOTAL CA: CPT | Performed by: INTERNAL MEDICINE

## 2025-04-09 PROCEDURE — 93880 EXTRACRANIAL BILAT STUDY: CPT | Performed by: RADIOLOGY

## 2025-04-09 PROCEDURE — 93306 TTE W/DOPPLER COMPLETE: CPT | Performed by: INTERNAL MEDICINE

## 2025-04-09 PROCEDURE — 2500000004 HC RX 250 GENERAL PHARMACY W/ HCPCS (ALT 636 FOR OP/ED): Performed by: INTERNAL MEDICINE

## 2025-04-09 RX ORDER — ASPIRIN 81 MG/1
81 TABLET ORAL DAILY
Status: DISCONTINUED | OUTPATIENT
Start: 2025-04-09 | End: 2025-04-09 | Stop reason: HOSPADM

## 2025-04-09 RX ORDER — AMLODIPINE BESYLATE 2.5 MG/1
2.5 TABLET ORAL DAILY
Status: DISCONTINUED | OUTPATIENT
Start: 2025-04-09 | End: 2025-04-09 | Stop reason: HOSPADM

## 2025-04-09 RX ORDER — GABAPENTIN 300 MG/1
300 CAPSULE ORAL 2 TIMES DAILY
Start: 2025-04-09

## 2025-04-09 RX ORDER — PRAVASTATIN SODIUM 20 MG/1
20 TABLET ORAL NIGHTLY
Start: 2025-04-09

## 2025-04-09 RX ORDER — PRAVASTATIN SODIUM 20 MG/1
20 TABLET ORAL NIGHTLY
Status: DISCONTINUED | OUTPATIENT
Start: 2025-04-09 | End: 2025-04-09 | Stop reason: HOSPADM

## 2025-04-09 RX ORDER — AMLODIPINE BESYLATE 2.5 MG/1
2.5 TABLET ORAL DAILY
Qty: 30 TABLET | Refills: 0 | Status: SHIPPED | OUTPATIENT
Start: 2025-04-10 | End: 2025-05-10

## 2025-04-09 RX ORDER — CALCIUM CARBONATE 500(1250)
1250 TABLET ORAL
Status: DISCONTINUED | OUTPATIENT
Start: 2025-04-09 | End: 2025-04-09 | Stop reason: HOSPADM

## 2025-04-09 RX ORDER — CALCIUM CARBONATE 500(1250)
1250 TABLET ORAL
Start: 2025-04-09

## 2025-04-09 RX ORDER — TRAZODONE HYDROCHLORIDE 50 MG/1
50 TABLET ORAL NIGHTLY
Start: 2025-04-09

## 2025-04-09 RX ORDER — GABAPENTIN 300 MG/1
300 CAPSULE ORAL 2 TIMES DAILY
Status: DISCONTINUED | OUTPATIENT
Start: 2025-04-09 | End: 2025-04-09 | Stop reason: HOSPADM

## 2025-04-09 RX ORDER — ASPIRIN 81 MG/1
81 TABLET ORAL DAILY
Start: 2025-04-10

## 2025-04-09 RX ADMIN — PERFLUTREN 2 ML OF DILUTION: 6.52 INJECTION, SUSPENSION INTRAVENOUS at 09:35

## 2025-04-09 RX ADMIN — AMLODIPINE BESYLATE 2.5 MG: 2.5 TABLET ORAL at 10:17

## 2025-04-09 RX ADMIN — ASPIRIN 81 MG: 81 TABLET, COATED ORAL at 11:06

## 2025-04-09 RX ADMIN — GABAPENTIN 300 MG: 300 CAPSULE ORAL at 11:06

## 2025-04-09 SDOH — ECONOMIC STABILITY: HOUSING INSECURITY: IN THE LAST 12 MONTHS, WAS THERE A TIME WHEN YOU WERE NOT ABLE TO PAY THE MORTGAGE OR RENT ON TIME?: NO

## 2025-04-09 SDOH — SOCIAL STABILITY: SOCIAL INSECURITY: WITHIN THE LAST YEAR, HAVE YOU BEEN HUMILIATED OR EMOTIONALLY ABUSED IN OTHER WAYS BY YOUR PARTNER OR EX-PARTNER?: NO

## 2025-04-09 SDOH — SOCIAL STABILITY: SOCIAL NETWORK
DO YOU BELONG TO ANY CLUBS OR ORGANIZATIONS SUCH AS CHURCH GROUPS, UNIONS, FRATERNAL OR ATHLETIC GROUPS, OR SCHOOL GROUPS?: YES

## 2025-04-09 SDOH — HEALTH STABILITY: MENTAL HEALTH: HOW OFTEN DO YOU HAVE SIX OR MORE DRINKS ON ONE OCCASION?: NEVER

## 2025-04-09 SDOH — ECONOMIC STABILITY: FOOD INSECURITY: HOW HARD IS IT FOR YOU TO PAY FOR THE VERY BASICS LIKE FOOD, HOUSING, MEDICAL CARE, AND HEATING?: NOT VERY HARD

## 2025-04-09 SDOH — SOCIAL STABILITY: SOCIAL INSECURITY
WITHIN THE LAST YEAR, HAVE YOU BEEN RAPED OR FORCED TO HAVE ANY KIND OF SEXUAL ACTIVITY BY YOUR PARTNER OR EX-PARTNER?: NO

## 2025-04-09 SDOH — HEALTH STABILITY: MENTAL HEALTH: HOW MANY DRINKS CONTAINING ALCOHOL DO YOU HAVE ON A TYPICAL DAY WHEN YOU ARE DRINKING?: 1 OR 2

## 2025-04-09 SDOH — ECONOMIC STABILITY: FOOD INSECURITY: WITHIN THE PAST 12 MONTHS, YOU WORRIED THAT YOUR FOOD WOULD RUN OUT BEFORE YOU GOT THE MONEY TO BUY MORE.: NEVER TRUE

## 2025-04-09 SDOH — ECONOMIC STABILITY: INCOME INSECURITY: IN THE PAST 12 MONTHS HAS THE ELECTRIC, GAS, OIL, OR WATER COMPANY THREATENED TO SHUT OFF SERVICES IN YOUR HOME?: NO

## 2025-04-09 SDOH — ECONOMIC STABILITY: HOUSING INSECURITY: AT ANY TIME IN THE PAST 12 MONTHS, WERE YOU HOMELESS OR LIVING IN A SHELTER (INCLUDING NOW)?: NO

## 2025-04-09 SDOH — HEALTH STABILITY: PHYSICAL HEALTH: ON AVERAGE, HOW MANY DAYS PER WEEK DO YOU ENGAGE IN MODERATE TO STRENUOUS EXERCISE (LIKE A BRISK WALK)?: 0 DAYS

## 2025-04-09 SDOH — HEALTH STABILITY: MENTAL HEALTH: HOW OFTEN DO YOU HAVE A DRINK CONTAINING ALCOHOL?: MONTHLY OR LESS

## 2025-04-09 SDOH — HEALTH STABILITY: PHYSICAL HEALTH
HOW OFTEN DO YOU NEED TO HAVE SOMEONE HELP YOU WHEN YOU READ INSTRUCTIONS, PAMPHLETS, OR OTHER WRITTEN MATERIAL FROM YOUR DOCTOR OR PHARMACY?: NEVER

## 2025-04-09 SDOH — SOCIAL STABILITY: SOCIAL NETWORK: HOW OFTEN DO YOU ATTEND CHURCH OR RELIGIOUS SERVICES?: NEVER

## 2025-04-09 SDOH — SOCIAL STABILITY: SOCIAL NETWORK: HOW OFTEN DO YOU GET TOGETHER WITH FRIENDS OR RELATIVES?: MORE THAN THREE TIMES A WEEK

## 2025-04-09 SDOH — SOCIAL STABILITY: SOCIAL NETWORK: HOW OFTEN DO YOU ATTEND MEETINGS OF THE CLUBS OR ORGANIZATIONS YOU BELONG TO?: MORE THAN 4 TIMES PER YEAR

## 2025-04-09 SDOH — ECONOMIC STABILITY: HOUSING INSECURITY: IN THE PAST 12 MONTHS, HOW MANY TIMES HAVE YOU MOVED WHERE YOU WERE LIVING?: 0

## 2025-04-09 SDOH — HEALTH STABILITY: MENTAL HEALTH
DO YOU FEEL STRESS - TENSE, RESTLESS, NERVOUS, OR ANXIOUS, OR UNABLE TO SLEEP AT NIGHT BECAUSE YOUR MIND IS TROUBLED ALL THE TIME - THESE DAYS?: NOT AT ALL

## 2025-04-09 SDOH — SOCIAL STABILITY: SOCIAL INSECURITY: WITHIN THE LAST YEAR, HAVE YOU BEEN AFRAID OF YOUR PARTNER OR EX-PARTNER?: NO

## 2025-04-09 SDOH — SOCIAL STABILITY: SOCIAL NETWORK
IN A TYPICAL WEEK, HOW MANY TIMES DO YOU TALK ON THE PHONE WITH FAMILY, FRIENDS, OR NEIGHBORS?: MORE THAN THREE TIMES A WEEK

## 2025-04-09 SDOH — ECONOMIC STABILITY: FOOD INSECURITY: WITHIN THE PAST 12 MONTHS, THE FOOD YOU BOUGHT JUST DIDN'T LAST AND YOU DIDN'T HAVE MONEY TO GET MORE.: NEVER TRUE

## 2025-04-09 SDOH — ECONOMIC STABILITY: TRANSPORTATION INSECURITY: IN THE PAST 12 MONTHS, HAS LACK OF TRANSPORTATION KEPT YOU FROM MEDICAL APPOINTMENTS OR FROM GETTING MEDICATIONS?: NO

## 2025-04-09 ASSESSMENT — COGNITIVE AND FUNCTIONAL STATUS - GENERAL
DAILY ACTIVITIY SCORE: 24
MOBILITY SCORE: 24

## 2025-04-09 ASSESSMENT — LIFESTYLE VARIABLES
SKIP TO QUESTIONS 9-10: 1
AUDIT-C TOTAL SCORE: 1

## 2025-04-09 ASSESSMENT — PAIN SCALES - GENERAL: PAINLEVEL_OUTOF10: 0 - NO PAIN

## 2025-04-09 ASSESSMENT — ACTIVITIES OF DAILY LIVING (ADL)
LACK_OF_TRANSPORTATION: NO
LACK_OF_TRANSPORTATION: NO

## 2025-04-09 NOTE — PROGRESS NOTES
04/09/25 1244   Discharge Planning   Living Arrangements Alone   Support Systems Children;Family members   Assistance Needed Independent with ADLs and IADLs   Type of Residence Private residence  (Mobile home one level)   Number of Stairs to Enter Residence 3   Number of Stairs Within Residence 0   Do you have animals or pets at home? No   Who is requesting discharge planning? Provider   Home or Post Acute Services None   Expected Discharge Disposition Home   Does the patient need discharge transport arranged? No   Financial Resource Strain   How hard is it for you to pay for the very basics like food, housing, medical care, and heating? Not very   Housing Stability   In the last 12 months, was there a time when you were not able to pay the mortgage or rent on time? N   In the past 12 months, how many times have you moved where you were living? 0   At any time in the past 12 months, were you homeless or living in a shelter (including now)? N   Transportation Needs   In the past 12 months, has lack of transportation kept you from medical appointments or from getting medications? no   In the past 12 months, has lack of transportation kept you from meetings, work, or from getting things needed for daily living? No   Intensity of Service   Intensity of Service 0-30 min     Carla Spain is a 79 y.o. female presenting with generalized malaise, fatigue and a syncopal event. The patient reportedly did not feel well at home and became lightheaded and then passed out and fell. She did hit her head. Unknown exactly how long she had a loss of consciousness for.   Her children report that she had back surgery on 11 March 2025. Patient reports she has been much more active since her surgery, as her pain has improved greatly.   Patient lives alone in a mobile one level home. Has a supportive family. Independent with ADLs and IADLs. Is able to drive and is active. Did have [facility], after back surgery, currently not active  with [facility]. Will have no needs upon discharge,    PLAN: Home with no needs

## 2025-04-09 NOTE — PROGRESS NOTES
Spiritual Care Visit  Spiritual Care Request    Reason for Visit:  Routine Visit: Introduction     Request Received From:       Focus of Care:  Visited With: Patient         Refer to :          Spiritual Care Assessment    Spiritual Assessment:                      Care Provided:  Intended Effects: Build relationship of care and support, Convey a calming presence, Demonstrate caring and concern    Sense of Community and or Taoism Affiliation:  Buddhism   Values/Beliefs  Spiritual Requests During Hospitalization: Carla asked to be anointed and tohae Communion today.     Addressed Needs/Concerns and/or Maurisio Through:     Sacramental Encounters  Communion: Patient wants communion  Communion Given Indicator: Yes  Sacrament of Sick-Anointing: Anointed    Outcome:        Advance Directives:         Spiritual Care Annotation    Annotation:  Carla asked to be anointed and to have Communion today.  Alcides Melissa

## 2025-04-09 NOTE — DISCHARGE SUMMARY
Discharge Diagnosis  - Syncope, unspecified syncope type  - HTN    Issues Requiring Follow-Up  - Syncope, unspecified syncope type  - HTN    Discharge Meds     Medication List      START taking these medications     amLODIPine 2.5 mg tablet; Commonly known as: Norvasc; Take 1 tablet (2.5   mg) by mouth once daily.; Start taking on: April 10, 2025   aspirin 81 mg EC tablet; Take 1 tablet (81 mg) by mouth once daily.;   Start taking on: April 10, 2025   calcium carbonate 500 mg calcium (1,250 mg) tablet; Commonly known as:   Oscal; Take 1 tablet (1,250 mg) by mouth 2 times daily (morning and late   afternoon).   gabapentin 300 mg capsule; Commonly known as: Neurontin; Take 1 capsule   (300 mg) by mouth 2 times a day.   pravastatin 20 mg tablet; Commonly known as: Pravachol; Take 1 tablet   (20 mg) by mouth once daily at bedtime.   traZODone 50 mg tablet; Commonly known as: Desyrel; Take 1 tablet (50   mg) by mouth once daily at bedtime.       Test Results Pending At Discharge  Pending Labs       Order Current Status    Extra Urine Gray Tube Collected (04/08/25 1224)    Urinalysis with Reflex Culture and Microscopic In process            Hospital Course   The patient is an 79-year-old female who presented with generalized malaise, fatigue, and a syncopal episode with minor head injury. She denied any ongoing neurological, cardiac, or gastrointestinal symptoms. Initial ED evaluation showed hypertension but was otherwise stable. She received 2,500 mL of IV fluids. Workup including EKG, labs, CT head, CT C-spine, CTA chest, and urinalysis was unremarkable.    Telemetry monitoring showed no arrhythmias. Orthostatic vitals were not significant. Echocardiogram and carotid Doppler studies were completed and were without acute findings. Her home medications were continued throughout her stay. She was given and discharged with Amlodipine 2.5 mg and adivsed to follow-up with her PCP for further management of her HTN.     She  remained stable throughout hospitalization with no further events and improved symptoms. She is safe for discharge with outpatient follow-up planned. Patient was agreeable with plan to be discharged home today.    Pertinent Physical Exam At Time of Discharge  Physical Exam  Constitutional:       General: She is not in acute distress.     Appearance: Normal appearance.   HENT:      Head: Normocephalic and atraumatic.   Cardiovascular:      Rate and Rhythm: Normal rate and regular rhythm.   Pulmonary:      Effort: Pulmonary effort is normal. No respiratory distress.      Breath sounds: Normal breath sounds. No wheezing, rhonchi or rales.   Abdominal:      General: Abdomen is flat. There is no distension.      Palpations: Abdomen is soft.      Tenderness: There is no abdominal tenderness. There is no guarding or rebound.   Musculoskeletal:      Right lower leg: No edema.      Left lower leg: No edema.   Skin:     General: Skin is warm and dry.   Neurological:      Mental Status: She is alert and oriented to person, place, and time.   Psychiatric:         Mood and Affect: Mood normal.         Behavior: Behavior normal.         Outpatient Follow-Up  - Recommended follow-up with PCP      KAUSHAL ADKINS-VALERY  4/9/2025  1:10PM

## 2025-04-09 NOTE — ASSESSMENT & PLAN NOTE
- most likely from dehydration from recent increased activity post-back surgery  - ECG, UA, BNP, lactate, troponins, CTA, CT- C-spine, and CT- head all unremarkable  - she has received 2500 mL of isotonic saline in the ED  - ECHO, carotid dopplers pending  - BMP this morning unremarkable, hyperchloremic from IV fluids given in ED       Hypertension  - started amlodipine 2.5 mg

## 2025-04-09 NOTE — NURSING NOTE
Pt feeling comfortable with going home denies weakness or dizziness aware to monitor bp and make follow with pcp  does not feel she need home pt

## 2025-04-09 NOTE — CARE PLAN
The patient's goals for the shift include pain control    The clinical goals for the shift include Comfort

## 2025-04-09 NOTE — PROGRESS NOTES
04/09/25 1250   ACS Disability Status   Are you deaf or do you have serious difficulty hearing? N   Are you blind or do you have serious difficulty seeing, even when wearing glasses? N   Because of a physical, mental, or emotional condition, do you have serious difficulty concentrating, remembering, or making decisions? (5 years old or older) N   Do you have serious difficulty walking or climbing stairs? Y   Do you have serious difficulty dressing or bathing? N   Because of a physical, mental, or emotional condition, do you have serious difficulty doing errands alone such as visiting the doctor? N

## 2025-04-09 NOTE — PROGRESS NOTES
04/09/25 1251   Physical Activity   On average, how many days per week do you engage in moderate to strenuous exercise (like a brisk walk)? 0 days   Financial Resource Strain   How hard is it for you to pay for the very basics like food, housing, medical care, and heating? Not very   Housing Stability   In the last 12 months, was there a time when you were not able to pay the mortgage or rent on time? N   In the past 12 months, how many times have you moved where you were living? 0   At any time in the past 12 months, were you homeless or living in a shelter (including now)? N   Transportation Needs   In the past 12 months, has lack of transportation kept you from medical appointments or from getting medications? no   In the past 12 months, has lack of transportation kept you from meetings, work, or from getting things needed for daily living? No   Food Insecurity   Within the past 12 months, you worried that your food would run out before you got the money to buy more. Never true   Within the past 12 months, the food you bought just didn't last and you didn't have money to get more. Never true   Stress   Do you feel stress - tense, restless, nervous, or anxious, or unable to sleep at night because your mind is troubled all the time - these days? Not at all   Social Connections   In a typical week, how many times do you talk on the phone with family, friends, or neighbors? More than 3   How often do you get together with friends or relatives? More than 3   How often do you attend Rastafari or Holiness services? Never   Do you belong to any clubs or organizations such as Rastafari groups, unions, fraternal or athletic groups, or school groups? Yes   How often do you attend meetings of the clubs or organizations you belong to? More than 4   Intimate Partner Violence   Within the last year, have you been afraid of your partner or ex-partner? No   Within the last year, have you been humiliated or emotionally abused in  other ways by your partner or ex-partner? No   Within the last year, have you been raped or forced to have any kind of sexual activity by your partner or ex-partner? No   Alcohol Use   Q1: How often do you have a drink containing alcohol? Monthly or l   Q2: How many drinks containing alcohol do you have on a typical day when you are drinking? 1 or 2   Q3: How often do you have six or more drinks on one occasion? Never   Utilities   In the past 12 months has the electric, gas, oil, or water company threatened to shut off services in your home? No   Health Literacy   How often do you need to have someone help you when you read instructions, pamphlets, or other written material from your doctor or pharmacy? Never

## 2025-04-11 NOTE — ED PROVIDER NOTES
HPI   Chief Complaint   Patient presents with    Fall     Patient bib EMS for home for a syncopal episode. States she hit her head, takes baby aspirin. Recent back surgery at [facility]. Lethargic but a and o x 4 upon arrival       HPI  Patient is an 79-year-old female who presents to ED for chief complaint of syncopal episode.  Patient was reportedly not feeling well today and fainted while at home.  She did hit her head.  Unknown how long patient was unconscious for.  Patient does have a history of syncope for her family.  She is very somnolent but she is moving all extremities and answering questions appropriately.      Patient History   History reviewed. No pertinent past medical history.  Past Surgical History:   Procedure Laterality Date    MR HEAD ANGIO WO IV CONTRAST  2/1/2016    MR HEAD ANGIO WO IV CONTRAST [facility]     No family history on file.  Social History     Tobacco Use    Smoking status: Not on file    Smokeless tobacco: Not on file   Substance Use Topics    Alcohol use: Not on file    Drug use: Not on file       Physical Exam   ED Triage Vitals   Temp Heart Rate Resp BP   04/08/25 1006 04/08/25 1006 04/08/25 1006 04/08/25 1006   36.4 °C (97.6 °F) 83 19 (!) 152/96      SpO2 Temp Source Heart Rate Source Patient Position   04/08/25 1006 04/08/25 1006 04/08/25 1620 04/08/25 1620   (!) 91 % Oral Monitor Lying      BP Location FiO2 (%)     04/08/25 1620 --     Left arm        Physical Exam  Vitals reviewed.   Constitutional:       General: She is not in acute distress.     Appearance: Normal appearance. She is not ill-appearing.   HENT:      Head: Normocephalic and atraumatic.   Eyes:      Extraocular Movements: Extraocular movements intact.   Cardiovascular:      Rate and Rhythm: Normal rate and regular rhythm.      Heart sounds: Normal heart sounds.   Pulmonary:      Effort: Pulmonary effort is normal.      Breath sounds: Normal breath sounds.   Abdominal:      Palpations: Abdomen is soft.       Tenderness: There is no abdominal tenderness.   Musculoskeletal:         General: Normal range of motion.      Cervical back: Normal range of motion and neck supple.   Skin:     General: Skin is warm and dry.   Neurological:      General: No focal deficit present.      Mental Status: She is alert and oriented to person, place, and time.   Psychiatric:         Mood and Affect: Mood normal.         Behavior: Behavior normal.    ED Course & MDM   Diagnoses as of 04/10/25 2312   Syncope, unspecified syncope type   Lethargy   Closed head injury, initial encounter   Hypertension, unspecified type                 No data recorded                                 Medical Decision Making  Parts of this chart have been completed using voice recognition software. Please excuse any errors of transcription.  My thought process and reason for plan has been formulated from the time that I saw the patient until the time of disposition and is not specific to one specific moment during their visit and furthermore my MDM encompasses this entire chart and not only this text box.    HPI:   A medically appropriate HPI was obtained, outlined above.    Carla Spain is a  79 y.o. female    Chief Complaint   Patient presents with    Fall     Patient bib EMS for home for a syncopal episode. States she hit her head, takes baby aspirin. Recent back surgery at [facility]. Lethargic but a and o x 4 upon arrival       History reviewed. No pertinent past medical history.    Past Surgical History:   Procedure Laterality Date    MR HEAD ANGIO WO IV CONTRAST  2/1/2016    MR HEAD ANGIO WO IV CONTRAST [facility]            No family history on file.    Allergies   Allergen Reactions    Sulfa (Sulfonamide Antibiotics) Anaphylaxis and Middleton-Navjot syndrome     aleksandra navjot syndome    Oxycodone-Acetaminophen Nausea/vomiting    Tramadol Itching       Current Outpatient Medications   Medication Instructions    amLODIPine (NORVASC) 2.5  mg, oral, Daily    aspirin 81 mg, oral, Daily    calcium carbonate (OSCAL) 1,250 mg, oral, 2 times daily (morning and late afternoon)    gabapentin (NEURONTIN) 300 mg, oral, 2 times daily    pravastatin (PRAVACHOL) 20 mg, oral, Nightly    traZODone (DESYREL) 50 mg, oral, Nightly   for details    Exam:   No data found.    A medically appropriate exam performed, outlined above, given the known history and presentation.    EKG/Cardiac monitor:   If EKG was done and, it was interpreted by attending physician, see their note for ED course for more detail.    Medications given during visit:  Medications   sodium chloride 0.9 % bolus 500 mL (0 mL intravenous Stopped 4/8/25 1346)   acetaminophen (Tylenol) tablet 975 mg (975 mg oral Given 4/8/25 1142)   iohexol (OMNIPaque) 350 mg iodine/mL solution 75 mL (75 mL intravenous Given 4/8/25 1128)   sodium chloride 0.9 % bolus 1,000 mL (1,000 mL intravenous New Bag 4/8/25 1346)   sodium chloride 0.9 % bolus 1,000 mL (1,000 mL intravenous New Bag 4/8/25 1348)   gabapentin (Neurontin) capsule 300 mg (300 mg oral Given 4/8/25 1734)   magnesium oxide (Mag-Ox) tablet 400 mg (400 mg oral Given 4/8/25 1734)   calcium carbonate (Tums) chewable tablet 500 mg (500 mg oral Given 4/8/25 1734)   perflutren lipid microspheres (Definity) injection 0.5-10 mL of dilution (2 mL of dilution intravenous Given 4/9/25 1381)        Diagnostic/tests:  Labs Reviewed   CBC WITH AUTO DIFFERENTIAL - Abnormal       Result Value    WBC 7.4      nRBC 0.0      RBC 4.22      Hemoglobin 12.1      Hematocrit 38.7      MCV 92      MCH 28.7      MCHC 31.3 (*)     RDW 13.3      Platelets 224      Neutrophils % 61.5      Immature Granulocytes %, Automated 0.3      Lymphocytes % 26.4      Monocytes % 8.1      Eosinophils % 3.0      Basophils % 0.7      Neutrophils Absolute 4.56      Immature Granulocytes Absolute, Automated 0.02      Lymphocytes Absolute 1.95      Monocytes Absolute 0.60      Eosinophils Absolute 0.22       Basophils Absolute 0.05     COMPREHENSIVE METABOLIC PANEL - Abnormal    Glucose 106 (*)     Sodium 140      Potassium 4.2      Chloride 107      Bicarbonate 25      Anion Gap 12      Urea Nitrogen 21      Creatinine 0.80      eGFR 74      Calcium 9.3      Albumin 4.0      Alkaline Phosphatase 75      Total Protein 6.7      AST 16      Bilirubin, Total 0.4      ALT 13     BLOOD GAS VENOUS FULL PANEL - Abnormal    POCT pH, Venous 7.39      POCT pCO2, Venous 45      POCT pO2, Venous 32 (*)     POCT SO2, Venous 56      POCT Oxy Hemoglobin, Venous 55.2      POCT Hematocrit Calculated, Venous 38.0      POCT Sodium, Venous 138      POCT Potassium, Venous 4.3      POCT Chloride, Venous 106      POCT Ionized Calicum, Venous 1.20      POCT Glucose, Venous 107 (*)     POCT Lactate, Venous 1.3      POCT Base Excess, Venous 1.8      POCT HCO3 Calculated, Venous 27.2 (*)     POCT Hemoglobin, Venous 12.5      POCT Anion Gap, Venous 9.0 (*)     Patient Temperature 37.0      FiO2 21     BASIC METABOLIC PANEL - Abnormal    Glucose 95      Sodium 140      Potassium 4.0      Chloride 112 (*)     Bicarbonate 23      Anion Gap 9 (*)     Urea Nitrogen 14      Creatinine 0.66      eGFR 88      Calcium 8.6     MAGNESIUM - Normal    Magnesium 2.16     LACTATE - Normal    Lactate 1.2      Narrative:     Venipuncture immediately after or during the administration of Metamizole may lead to falsely low results. Testing should be performed immediately prior to Metamizole dosing.   B-TYPE NATRIURETIC PEPTIDE - Normal    BNP 39      Narrative:        <100 pg/mL - Heart failure unlikely  100-299 pg/mL - Intermediate probability of acute heart                  failure exacerbation. Correlate with clinical                  context and patient history.    >=300 pg/mL - Heart Failure likely. Correlate with clinical                  context and patient history.    BNP testing is performed using different testing methodology at [facility]  than at other [facility] hospitals. Direct result comparisons should only be made within the same method.      SARS-COV-2 AND INFLUENZA A/B PCR - Normal    Flu A Result Not Detected      Flu B Result Not Detected      Coronavirus 2019, PCR Not Detected      Narrative:     This assay is an FDA-cleared, in vitro diagnostic nucleic acid amplification test for the qualitative detection and differentiation of SARS CoV-2/ Influenza A/B from nasopharyngeal specimens collected from individuals with signs and symptoms of respiratory tract infections, and has been validated for use at [facility]. Negative results do not preclude COVID-19/ Influenza A/B infections and should not be used as the sole basis for diagnosis, treatment, or other management decisions. Testing for SARS CoV-2 is recommended only for patients who meet current clinical and/or epidemiological criteria defined by federal, state, or local public health directives.   SERIAL TROPONIN-INITIAL - Normal    Troponin I, High Sensitivity 5      Narrative:     Less than 99th percentile of normal range cutoff-  Female and children under 18 years old <14 ng/L; Male <21 ng/L: Negative  Repeat testing should be performed if clinically indicated.     Female and children under 18 years old 14-50 ng/L; Male 21-50 ng/L:  Consistent with possible cardiac damage and possible increased clinical   risk. Serial measurements may help to assess extent of myocardial damage.     >50 ng/L: Consistent with cardiac damage, increased clinical risk and  myocardial infarction. Serial measurements may help assess extent of   myocardial damage.      NOTE: Children less than 1 year old may have higher baseline troponin   levels and results should be interpreted in conjunction with the overall   clinical context.     NOTE: Troponin I testing is performed using a different   testing methodology at [facility] than at other   [facility]. Direct result  comparisons should only   be made within the same method.   SERIAL TROPONIN, 1 HOUR - Normal    Troponin I, High Sensitivity 5      Narrative:     Less than 99th percentile of normal range cutoff-  Female and children under 18 years old <14 ng/L; Male <21 ng/L: Negative  Repeat testing should be performed if clinically indicated.     Female and children under 18 years old 14-50 ng/L; Male 21-50 ng/L:  Consistent with possible cardiac damage and possible increased clinical   risk. Serial measurements may help to assess extent of myocardial damage.     >50 ng/L: Consistent with cardiac damage, increased clinical risk and  myocardial infarction. Serial measurements may help assess extent of   myocardial damage.      NOTE: Children less than 1 year old may have higher baseline troponin   levels and results should be interpreted in conjunction with the overall   clinical context.     NOTE: Troponin I testing is performed using a different   testing methodology at [facility] than at other   [facility]. Direct result comparisons should only   be made within the same method.   URINALYSIS WITH REFLEX CULTURE AND MICROSCOPIC - Normal    Color, Urine Light-Yellow      Appearance, Urine Clear      Specific Gravity, Urine 1.016      pH, Urine 7.0      Protein, Urine NEGATIVE      Glucose, Urine Normal      Blood, Urine NEGATIVE      Ketones, Urine NEGATIVE      Bilirubin, Urine NEGATIVE      Urobilinogen, Urine Normal      Nitrite, Urine NEGATIVE      Leukocyte Esterase, Urine NEGATIVE     TROPONIN SERIES- (INITIAL, 1 HR)    Narrative:     The following orders were created for panel order Troponin I Series, High Sensitivity (0, 1 HR).  Procedure                               Abnormality         Status                     ---------                               -----------         ------                     Troponin I, High Sensiti...[[id number]]  Normal              Final result               Troponin, High  Sensitivi...[[id number]]  Normal              Final result                 Please view results for these tests on the individual orders.   URINALYSIS WITH REFLEX CULTURE AND MICROSCOPIC    Narrative:     The following orders were created for panel order Urinalysis with Reflex Culture and Microscopic.  Procedure                               Abnormality         Status                     ---------                               -----------         ------                     Urinalysis with Reflex C...[[id number]]  Normal              Final result               Extra Urine Gray Tube[[id number]]                                                         Please view results for these tests on the individual orders.   EXTRA URINE GRAY TUBE        Holter Or Event Cardiac Monitor         Carotid duplex bilateral   Final Result   Less than 50% stenosis of the internal carotid arteries bilaterally.        MACRO:   None.        Signed by: Nish Argueta 4/9/2025 10:26 AM   Dictation workstation:   [id number]      Transthoracic Echo (TTE) Complete   Final Result      CT angio chest for pulmonary embolism   Final Result   1.  No evidence of pulmonary embolus.   2. Cardiomegaly with coronary atherosclerotic calcifications.   3. 3 cm main pulmonary artery diameter which may indicate pulmonary   hypertension.             Signed by: Mauricio Lincoln 4/8/2025 12:27 PM   Dictation workstation:   [id number]      CT head wo IV contrast   Final Result   No CT evidence of acute intracranial injury.                  MACRO:   None             Signed by: Mauricio Lincoln 4/8/2025 12:02 PM   Dictation workstation:   [id number]      CT cervical spine wo IV contrast   Final Result   Multilevel spondylosis without acute osseous abnormality detected of   the cervical spine.        MACRO:   None        Signed by: Mauricio Lincoln 4/8/2025 12:04 PM   Dictation workstation:   [id number]             [facility] Summary:  Patient is very weak and lethargic.  She is  not back to her baseline.  Workup is essentially unremarkable however we do feel she needs further observation at this time.  She was admitted to medicine for further management.    Disposition:  ED Prescriptions       Medication Sig Dispense Start Date End Date Auth. Provider    amLODIPine (Norvasc) 2.5 mg tablet Take 1 tablet (2.5 mg) by mouth once daily. 30 tablet 4/10/2025 5/10/2025 Medhat Diallo MD    aspirin 81 mg EC tablet Take 1 tablet (81 mg) by mouth once daily. -- 4/10/2025 -- Medhat Diallo MD    calcium carbonate (Oscal) 500 mg calcium (1,250 mg) tablet Take 1 tablet (1,250 mg) by mouth 2 times daily (morning and late afternoon). -- 4/9/2025 -- Medhat Diallo MD    gabapentin (Neurontin) 300 mg capsule Take 1 capsule (300 mg) by mouth 2 times a day. -- 4/9/2025 -- Medhat Diallo MD    pravastatin (Pravachol) 20 mg tablet Take 1 tablet (20 mg) by mouth once daily at bedtime. -- 4/9/2025 -- Medhat Diallo MD    traZODone (Desyrel) 50 mg tablet Take 1 tablet (50 mg) by mouth once daily at bedtime. -- 4/9/2025 -- Medhat Diallo MD              Procedure  Procedures     Yimi Baum PA-C  04/10/25 8727

## 2025-05-05 LAB — BODY SURFACE AREA: 2.07 M2

## 2025-05-05 PROCEDURE — 93248 EXT ECG>7D<15D REV&INTERPJ: CPT | Performed by: INTERNAL MEDICINE
